# Patient Record
Sex: FEMALE | Race: WHITE | NOT HISPANIC OR LATINO
[De-identification: names, ages, dates, MRNs, and addresses within clinical notes are randomized per-mention and may not be internally consistent; named-entity substitution may affect disease eponyms.]

---

## 2022-04-14 ENCOUNTER — RESULT REVIEW (OUTPATIENT)
Age: 82
End: 2022-04-14

## 2022-04-28 ENCOUNTER — APPOINTMENT (OUTPATIENT)
Dept: PULMONOLOGY | Facility: CLINIC | Age: 82
End: 2022-04-28
Payer: MEDICARE

## 2022-04-28 ENCOUNTER — NON-APPOINTMENT (OUTPATIENT)
Age: 82
End: 2022-04-28

## 2022-04-28 VITALS
HEART RATE: 98 BPM | TEMPERATURE: 97 F | HEIGHT: 68 IN | OXYGEN SATURATION: 97 % | DIASTOLIC BLOOD PRESSURE: 72 MMHG | BODY MASS INDEX: 18.34 KG/M2 | SYSTOLIC BLOOD PRESSURE: 126 MMHG | WEIGHT: 121 LBS

## 2022-04-28 DIAGNOSIS — R91.8 OTHER NONSPECIFIC ABNORMAL FINDING OF LUNG FIELD: ICD-10-CM

## 2022-04-28 DIAGNOSIS — E78.00 PURE HYPERCHOLESTEROLEMIA, UNSPECIFIED: ICD-10-CM

## 2022-04-28 DIAGNOSIS — C18.9 MALIGNANT NEOPLASM OF COLON, UNSPECIFIED: ICD-10-CM

## 2022-04-28 DIAGNOSIS — K44.9 DIAPHRAGMATIC HERNIA W/OUT OBSTRUCTION OR GANGRENE: ICD-10-CM

## 2022-04-28 DIAGNOSIS — Z01.818 ENCOUNTER FOR OTHER PREPROCEDURAL EXAMINATION: ICD-10-CM

## 2022-04-28 PROCEDURE — 99204 OFFICE O/P NEW MOD 45 MIN: CPT

## 2022-04-28 RX ORDER — AZITHROMYCIN 250 MG/1
250 TABLET, FILM COATED ORAL
Qty: 9 | Refills: 0 | Status: ACTIVE | COMMUNITY
Start: 2022-04-28

## 2022-04-28 RX ORDER — LEVOTHYROXINE SODIUM 75 UG/1
75 TABLET ORAL
Refills: 0 | Status: ACTIVE | COMMUNITY
Start: 2022-04-28

## 2022-04-28 RX ORDER — ATORVASTATIN CALCIUM 40 MG/1
40 TABLET, FILM COATED ORAL
Refills: 0 | Status: ACTIVE | COMMUNITY
Start: 2022-04-28

## 2022-04-28 RX ORDER — OMEPRAZOLE 40 MG/1
40 CAPSULE, DELAYED RELEASE ORAL
Qty: 30 | Refills: 3 | Status: ACTIVE | COMMUNITY
Start: 2022-04-28 | End: 1900-01-01

## 2022-04-28 NOTE — REVIEW OF SYSTEMS
[Recent Wt Loss (___ Lbs)] : ~T recent [unfilled] lb weight loss [Arthralgias] : arthralgias [Dizziness] : dizziness [Memory Loss] : memory loss [Depression] : depression [Anxiety] : anxiety [Thyroid Problem] : thyroid problem [Negative] : Musculoskeletal [GERD] : no gerd [Anemia] : no anemia [Panic Attacks] : no panic attacks [Diabetes] : no diabetes [Obesity] : no obesity [TextBox_3] : since feb [TextBox_30] : no asthma or pneumonnia [TextBox_69] : large hiatal hernia on the ct scan  l;radha colon cancer causing her abd pain [TextBox_83] : utis [TextBox_104] : skin cancer [TextBox_144] : hypothyroidism

## 2022-04-28 NOTE — HISTORY OF PRESENT ILLNESS
[Never] : never [TextBox_4] : 4/27/2022 Johanna Storey is a 81-year-old white female who was well until the middle of the summer last year after she had shoulder surgery.  The patient lost some functional status at that point and then this started having some right flank discomfort.  Patient developed constipation and went to see a GI doctor.  Ultimately a colonoscopy was done which revealed a mass in her colon that biopsied to be adenocarcinoma.  CAT scan of the abdomen pelvis revealed that the mass was large 8.7 x 10 cm.  The patient does not have any history of smoking nor does she have any history of respiratory disease asthma or pneumonia.  The patient was noted to have changes of the inflammatory nature in the right lower lobe.  The radiologist also felt she had multiple small mucoid impactions.  The patient offers no symptoms of cough or sputum production wheezing.  The patient does have fatigue and has lost 15 pounds since February.

## 2022-04-28 NOTE — REASON FOR VISIT
[Initial] : an initial visit [Shortness of Breath] : shortness of breath [TextBox_44] : Review CT of Chest 4/26/22, Dizziness, SOB on exertion, night sweats

## 2022-04-28 NOTE — ASSESSMENT
[FreeTextEntry1] : \par 4/27/2022 the patient is here for pulmonary evaluation prior to having a colon resection.  1) the patient has no history of lung disease.  She has some shortness of breath but that is associated with her recent weight loss.  The patient has a BMI of only 18.  2) the patient had a spirometry that revealed normal FEV1 2.14 L which is quite adequate for the proposed resection and general anesthesia. her SaO2 was 97 at rest and 91% after walking   3) patient has inflammatory changes in the right lower lobe she has no history of any pneumonia or pulmonary process she however has a very large hiatal hernia and may be having silent aspiration.  At this time I will prescribe a antibiotic and a PPI 4) the patient has no contraindication to the proposed colon resection and general anesthesia patient has no increased pulmonary risk based on her lung function but she does have risk of aspiration from high hiatal hernia and risk for recovery based on her poor nutritional status.  Overall I would say she has a moderate increased risk for pulmonary complications\par Time spent 45 minutes counseling education review of imaging review of old records physical exam and history

## 2022-04-28 NOTE — PHYSICAL EXAM
[No Acute Distress] : no acute distress [Normal Oropharynx] : normal oropharynx [Normal Appearance] : normal appearance [No Neck Mass] : no neck mass [Normal Rate/Rhythm] : normal rate/rhythm [Normal S1, S2] : normal s1, s2 [No Murmurs] : no murmurs [No Resp Distress] : no resp distress [Clear to Auscultation Bilaterally] : clear to auscultation bilaterally [No Abnormalities] : no abnormalities [Benign] : benign [Normal Gait] : normal gait [No Clubbing] : no clubbing [No Cyanosis] : no cyanosis [No Edema] : no edema [FROM] : FROM [Normal Color/ Pigmentation] : normal color/ pigmentation [No Focal Deficits] : no focal deficits [Oriented x3] : oriented x3 [Normal Affect] : normal affect [TextBox_2] : very thin bmi 18

## 2022-05-16 ENCOUNTER — RESULT REVIEW (OUTPATIENT)
Age: 82
End: 2022-05-16

## 2022-05-16 ENCOUNTER — APPOINTMENT (OUTPATIENT)
Dept: UROLOGY | Facility: HOSPITAL | Age: 82
End: 2022-05-16

## 2022-06-01 ENCOUNTER — APPOINTMENT (OUTPATIENT)
Dept: ORTHOPEDIC SURGERY | Facility: CLINIC | Age: 82
End: 2022-06-01

## 2022-07-05 ENCOUNTER — NON-APPOINTMENT (OUTPATIENT)
Age: 82
End: 2022-07-05

## 2022-08-23 ENCOUNTER — APPOINTMENT (OUTPATIENT)
Dept: PULMONOLOGY | Facility: CLINIC | Age: 82
End: 2022-08-23

## 2022-09-02 ENCOUNTER — NON-APPOINTMENT (OUTPATIENT)
Age: 82
End: 2022-09-02

## 2022-09-14 ENCOUNTER — RESULT REVIEW (OUTPATIENT)
Age: 82
End: 2022-09-14